# Patient Record
Sex: MALE
[De-identification: names, ages, dates, MRNs, and addresses within clinical notes are randomized per-mention and may not be internally consistent; named-entity substitution may affect disease eponyms.]

---

## 2022-10-05 PROBLEM — Z00.00 ENCOUNTER FOR PREVENTIVE HEALTH EXAMINATION: Status: ACTIVE | Noted: 2022-10-05

## 2022-11-16 ENCOUNTER — NON-APPOINTMENT (OUTPATIENT)
Age: 47
End: 2022-11-16

## 2022-11-18 ENCOUNTER — NON-APPOINTMENT (OUTPATIENT)
Age: 47
End: 2022-11-18

## 2022-11-18 ENCOUNTER — APPOINTMENT (OUTPATIENT)
Dept: UROLOGY | Facility: CLINIC | Age: 47
End: 2022-11-18

## 2022-11-18 VITALS
TEMPERATURE: 98.6 F | SYSTOLIC BLOOD PRESSURE: 142 MMHG | HEART RATE: 80 BPM | DIASTOLIC BLOOD PRESSURE: 91 MMHG | HEIGHT: 72 IN | WEIGHT: 220 LBS | BODY MASS INDEX: 29.8 KG/M2

## 2022-11-18 DIAGNOSIS — E29.1 TESTICULAR HYPOFUNCTION: ICD-10-CM

## 2022-11-18 PROCEDURE — 99204 OFFICE O/P NEW MOD 45 MIN: CPT

## 2022-11-21 NOTE — ASSESSMENT
[FreeTextEntry1] : Hypogonadism \par Wife is 49F - one frozen egg, considering donor egg\par 4 SA's - azoospermia, 2 nonmotile sperm \par TT  174 Sept 2022\par will attempt to optimize hormones as much as possible piror ot attempt at TESE\par role TESE vs microTESE reviewed\par had previously been on HCG\par Continue Clomid \par Trial HCG 3000 IU 2/week\par Repeat labs in one month\par \par Follow up telehealth in 6 weeks \par patient lives in [place] - will aim for Telehealth follow up as much as possible

## 2022-11-21 NOTE — LETTER BODY
[Dear  ___] : Dear  [unfilled], [FreeTextEntry2] : Jocelynn Payne MD\par [facility]\par [address] \[place] [address] [FreeTextEntry1] : I had the pleasure of seeing VISH ROBERT, a 47 year old man,  to your patient Erika Robert, in the office in consultation today. Please see the attached note for full details.\par \par Thank you very much for allowing me to participate in the care of this patient. If you have any questions please feel free to call me at any time. \par \par \par Sincerely yours,\par \par \par \par Terrence Montiel MD, CHRISTIANO\par Director, Male Fertility and Microsurgery\par  of Urology\par [facility]\par

## 2022-11-21 NOTE — PHYSICAL EXAM
[General Appearance - Well Developed] : well developed [General Appearance - Well Nourished] : well nourished [Normal Appearance] : normal appearance [Well Groomed] : well groomed [General Appearance - In No Acute Distress] : no acute distress [Edema] : no peripheral edema [Respiration, Rhythm And Depth] : normal respiratory rhythm and effort [Exaggerated Use Of Accessory Muscles For Inspiration] : no accessory muscle use [Abdomen Soft] : soft [Abdomen Tenderness] : non-tender [Costovertebral Angle Tenderness] : no ~M costovertebral angle tenderness [Urethral Meatus] : meatus normal [Penis Abnormality] : normal circumcised penis [Urinary Bladder Findings] : the bladder was normal on palpation [Scrotum] : the scrotum was normal [Testes Mass (___cm)] : there were no testicular masses [No Prostate Nodules] : no prostate nodules [Normal Station and Gait] : the gait and station were normal for the patient's age [] : no rash [No Focal Deficits] : no focal deficits [Oriented To Time, Place, And Person] : oriented to person, place, and time [Affect] : the affect was normal [Mood] : the mood was normal [Not Anxious] : not anxious [No Palpable Adenopathy] : no palpable adenopathy [FreeTextEntry1] : bilateral 8 cc testis, firm, palpable vasa

## 2022-11-21 NOTE — HISTORY OF PRESENT ILLNESS
Nad. Pt resting. [Currently Experiencing ___] :  [unfilled] [Erectile Dysfunction] : Erectile Dysfunction [None] : None [FreeTextEntry1] :  47 yr old male with h/o of MUMPs. Dr Payne . [facility] \par Presents today to establish care, they travelled from [place]. 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 - have seen multiple urologist \par No children on either partner \par 4 semen analysis this year 2022 - azoospermia, recent 2 non motile sperms seen \par 2018 - few thousand rare sperms  when started in Clomid  25 mg\par takes Clomid 25 days 5 days off\par low to moderate sexual drive, \par Previously on Anastrazole and HCG  last year\par reports no ED problems \par TT  174 Sept 2022 - taken at noontime\par E2- 7\par FSH - 22\par LH 8.3\par ALT - 60\par AST 43\par 2 to 3 Scrotal Ultrasound  - unremarkable results \par Denies urination problem\par \par \par Social hx: quit smoking 4.5 yrs ago. denies alcohol \par FHx: denies prostate or bladder cancer \par

## 2023-01-13 ENCOUNTER — NON-APPOINTMENT (OUTPATIENT)
Age: 48
End: 2023-01-13

## 2023-01-13 ENCOUNTER — APPOINTMENT (OUTPATIENT)
Dept: UROLOGY | Facility: CLINIC | Age: 48
End: 2023-01-13
Payer: COMMERCIAL

## 2023-01-13 PROCEDURE — 99214 OFFICE O/P EST MOD 30 MIN: CPT | Mod: 95

## 2023-01-17 NOTE — HISTORY OF PRESENT ILLNESS
[FreeTextEntry1] : The patient-doctor relationship has been established in a face to face fashion via real time video/audio HIPAA compliant communication using telemedicine software.  The patient's identity has been confirmed.  The patient was previously emailed a copy of the telemedicine consent.  They have had a chance to review and has now given verbal consent and has requested care to be assessed and treated through telemedicine and understands there maybe limitations in this process and they may need further follow up care in the office and or hospital settings.   \par  \par \par did not start HCG due to difficulties with obtaining medication\par discussed optoins with him and his wife\par using clomid 25 qd

## 2023-01-17 NOTE — ASSESSMENT
[FreeTextEntry1] : hypogonadism\par azoospermia\par cont clomid\par reviewed again - start HCG 3000 2/week\par labs 4 weeks\par telehealth in 6 weeks\par consider repeat SA 3 months

## 2023-03-10 RX ORDER — CHORIONIC GONADOTROPIN 10000 UNIT
10000 KIT INTRAMUSCULAR
Qty: 4 | Refills: 0 | Status: ACTIVE | COMMUNITY
Start: 2022-12-20 | End: 1900-01-01

## 2023-03-14 ENCOUNTER — RX RENEWAL (OUTPATIENT)
Age: 48
End: 2023-03-14

## 2023-03-14 RX ORDER — SYRINGE W-NEEDLE,DISPOSAB,3 ML 20GX1 1/2"
20G X 1-1/2" SYRINGE, EMPTY DISPOSABLE MISCELLANEOUS
Qty: 30 | Refills: 0 | Status: ACTIVE | COMMUNITY
Start: 2023-03-14 | End: 1900-01-01